# Patient Record
Sex: FEMALE | Race: WHITE
[De-identification: names, ages, dates, MRNs, and addresses within clinical notes are randomized per-mention and may not be internally consistent; named-entity substitution may affect disease eponyms.]

---

## 2020-09-22 ENCOUNTER — HOSPITAL ENCOUNTER (EMERGENCY)
Dept: HOSPITAL 53 - M ED | Age: 60
Discharge: HOME | End: 2020-09-22
Payer: COMMERCIAL

## 2020-09-22 VITALS — SYSTOLIC BLOOD PRESSURE: 133 MMHG | DIASTOLIC BLOOD PRESSURE: 78 MMHG

## 2020-09-22 VITALS — WEIGHT: 268.74 LBS | HEIGHT: 62 IN | BODY MASS INDEX: 49.45 KG/M2

## 2020-09-22 DIAGNOSIS — N64.4: ICD-10-CM

## 2020-09-22 DIAGNOSIS — N28.1: Primary | ICD-10-CM

## 2020-09-22 DIAGNOSIS — Z91.013: ICD-10-CM

## 2020-09-22 LAB
ALBUMIN SERPL BCG-MCNC: 4 GM/DL (ref 3.2–5.2)
ALT SERPL W P-5'-P-CCNC: 25 U/L (ref 12–78)
BASOPHILS # BLD AUTO: 0 10^3/UL (ref 0–0.2)
BASOPHILS NFR BLD AUTO: 0.4 % (ref 0–1)
BILIRUB CONJ SERPL-MCNC: 0.2 MG/DL (ref 0–0.2)
BILIRUB SERPL-MCNC: 0.6 MG/DL (ref 0.2–1)
EOSINOPHIL # BLD AUTO: 0 10^3/UL (ref 0–0.5)
EOSINOPHIL NFR BLD AUTO: 0.5 % (ref 0–3)
HCT VFR BLD AUTO: 42.4 % (ref 36–47)
HGB BLD-MCNC: 14.6 G/DL (ref 12–15.5)
LIPASE SERPL-CCNC: 63 U/L (ref 73–393)
LYMPHOCYTES # BLD AUTO: 1.5 10^3/UL (ref 1.5–5)
LYMPHOCYTES NFR BLD AUTO: 20.5 % (ref 24–44)
MCH RBC QN AUTO: 32.4 PG (ref 27–33)
MCHC RBC AUTO-ENTMCNC: 34.4 G/DL (ref 32–36.5)
MCV RBC AUTO: 94.2 FL (ref 80–96)
MONOCYTES # BLD AUTO: 0.6 10^3/UL (ref 0–0.8)
MONOCYTES NFR BLD AUTO: 7.7 % (ref 0–5)
NEUTROPHILS # BLD AUTO: 5.3 10^3/UL (ref 1.5–8.5)
NEUTROPHILS NFR BLD AUTO: 70.6 % (ref 36–66)
PLATELET # BLD AUTO: 235 10^3/UL (ref 150–450)
PROT SERPL-MCNC: 7.9 GM/DL (ref 6.4–8.2)
RBC # BLD AUTO: 4.5 10^6/UL (ref 4–5.4)
WBC # BLD AUTO: 7.5 10^3/UL (ref 4–10)

## 2020-09-22 PROCEDURE — 36415 COLL VENOUS BLD VENIPUNCTURE: CPT

## 2020-09-22 PROCEDURE — 74177 CT ABD & PELVIS W/CONTRAST: CPT

## 2020-09-22 PROCEDURE — 99284 EMERGENCY DEPT VISIT MOD MDM: CPT

## 2020-09-22 PROCEDURE — 83690 ASSAY OF LIPASE: CPT

## 2020-09-22 PROCEDURE — 74176 CT ABD & PELVIS W/O CONTRAST: CPT

## 2020-09-22 PROCEDURE — 80047 BASIC METABLC PNL IONIZED CA: CPT

## 2020-09-22 PROCEDURE — 81001 URINALYSIS AUTO W/SCOPE: CPT

## 2020-09-22 PROCEDURE — 85025 COMPLETE CBC W/AUTO DIFF WBC: CPT

## 2020-09-22 PROCEDURE — 80076 HEPATIC FUNCTION PANEL: CPT

## 2020-09-22 NOTE — REPVR
PROCEDURE INFORMATION: 

Exam: CT Abdomen And Pelvis With Contrast; Kidneys 

Exam date and time: 9/22/2020 5:24 PM 

Age: 60 years old 

Clinical indication: Abnormal findings; Abnormal radiologic finding of the 

abdomen; Radiologic exam and body structure: CT w/o contrast; Additional info: 

R renal lesion 



TECHNIQUE: 

Imaging protocol: Computed tomography of the abdomen and pelvis with 

intravenous contrast. Exam focused on the kidneys. 

Radiation optimization: All CT scans at this facility use at least one of these 

dose optimization techniques: automated exposure control; mA and/or kV 

adjustment per patient size (includes targeted exams where dose is matched to 

clinical indication); or iterative reconstruction. 

Contrast material: ISOVUE 370; Contrast volume: 100 ml; Contrast route: 

INTRAVENOUS (IV);  



COMPARISON: 

CT ABD PELVIS W/O CONTRAST 9/22/2020 3:34 PM 



FINDINGS: 

Lungs: Clear appearing lung bases. 

Liver: Normal appearing liver. 

Gallbladder and bile ducts:  Post cholecystectomy.

  Pancreas:  Normal in size.

Spleen: Normal spleen. 

Adrenals: Normal adrenal glands. 

Kidneys and ureters: 1.6 cm round low-density lesion upper pole of the right 

kidney consistent with a cyst after the injection of contrast. There is 

enhancement of both kidneys. 

Stomach and bowel: There are multiple diverticula of the left colon and sigmoid 

colon but no evidence of diverticulitis. 



Intraperitoneal space: There is no evidence of free fluid in the abdomen or the 

pelvis. 

Lymph nodes: Unremarkable. No enlarged lymph nodes. 

Vasculature: There is opacification of the aorta which appears intact. 

Bladder: Normal urinary bladder. 

Reproductive: Normal uterus. Normal-sized ovaries. 

Bones/joints:  Severe DJD and severe central canal stenosis L4-L5 with severe 

bilateral neural foraminal narrowing.

  Soft tissues: Unremarkable. 



IMPRESSION: 

1.6 cm cyst upper pole of the right kidney. 



Electronically signed by: Morales Edmond On 09/22/2020  18:12:09 PM

## 2020-09-22 NOTE — REPVR
PROCEDURE INFORMATION: 

Exam: CT Abdomen And Pelvis Without Contrast 

Exam date and time: 9/22/2020 3:45 PM 

Age: 60 years old 

Clinical indication: Abdominal pain; Localized; Left upper quadrant (luq); 

Additional info: Luq pain HX divertic 



TECHNIQUE: 

Imaging protocol: Computed tomography of the abdomen and pelvis without 

contrast. 

Radiation optimization: All CT scans at this facility use at least one of these 

dose optimization techniques: automated exposure control; mA and/or kV 

adjustment per patient size (includes targeted exams where dose is matched to 

clinical indication); or iterative reconstruction. 



COMPARISON: 

No relevant prior studies available. 



FINDINGS: 

Lungs: Clear appearing lung bases. 



Liver: Normal appearing liver. 

Gallbladder and bile ducts: Surgical clips are noted at the gallbladder fossa 

and the patient is status post cholecystectomy. 

Pancreas: The pancreas is normal in size. 

Spleen: Normal. No splenomegaly. 

Adrenals: Normal adrenal glands. 

Kidneys and ureters: There is a 1.7 cm low-density lesion of the upper pole of 

the right kidney. This could represent a cyst. However, I would recommend 

correlation with a CT scan with contrast to determine whether this is cystic or 

solid. If this was a solid lesion it would be of concern for malignancy. There 

is no evidence of stone in the path of the right or left ureter. 

Stomach and bowel: Unremarkable. No obstruction. No mucosal thickening. 

Appendix: No evidence of appendicitis. 



Intraperitoneal space: There is no evidence of pneumoperitoneum. 

Vasculature: Unremarkable. No abdominal aortic aneurysm. 

Lymph nodes: Unremarkable. No enlarged lymph nodes. 

Bladder:  Normal appearing urinary bladder.

Reproductive:  Normal size uterus and ovaries.

Bones/joints: There is grade 2 spondylolisthesis of L4 on L5 which produces 

severe central spinal canal stenosis and severe bilateral L4 neural foraminal 

narrowing. The L3-L4 level demonstrates a large posterior disc protrusion 

causing severe central spinal canal stenosis. The L1-L2 level demonstrates a 

very large posterior disc protrusion causing severe central spinal canal 

stenosis. The T12-L1 level demonstrates a large posterior osteophyte causing 

severe central spinal canal stenosis. T9 through T12 demonstrates posterior 

disc osteophyte complex causing severe central canal stenosis as well. There is 

severe sclerosis and facet hypertrophy lower lumbar region. 

Soft tissues: Unremarkable. 



IMPRESSION: 

1. Severe central spinal canal stenosis at multiple levels as described above. 

2. 1.7 cm low-density lesion right kidney. It would be important to have a CT 

scan with contrast to determine if this is cystic or solid. If this is solid 

than malignant lesion would be a concern. 

3. Numerous diverticula left colon and sigmoid colon with no evidence of 

diverticulitis. 



Electronically signed by: Morales Edmond On 09/22/2020  16:39:48 PM

## 2020-11-20 ENCOUNTER — HOSPITAL ENCOUNTER (OUTPATIENT)
Dept: HOSPITAL 53 - M WUC | Age: 60
End: 2020-11-20
Attending: PHYSICIAN ASSISTANT
Payer: COMMERCIAL

## 2020-11-20 DIAGNOSIS — S73.111A: Primary | ICD-10-CM

## 2020-11-20 DIAGNOSIS — M51.36: ICD-10-CM

## 2020-11-20 DIAGNOSIS — M54.31: ICD-10-CM
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
## 2020-11-20 NOTE — REP
INDICATION:

SCIATICA, SPRAIN.



COMPARISON:

None



TECHNIQUE:

AP and frog-lateral views



FINDINGS:

The hip joint space is symmetric and relatively well maintained.  There is no acute

fracture or destructive osseous lesion



IMPRESSION:

.  Negative exam





<Electronically signed by Luis Walker > 11/20/20 [id number]

## 2020-11-20 NOTE — REP
INDICATION:

SCIATICA, SPRAIN.



COMPARISON:

None



FINDINGS:

There is no evidence of an acute fracture or destructive osseous lesion.  Mild

degenerative changes seen involving the inferior sacroiliac joints.  Degenerative

changes are seen involving the imaged portion of the lumbar spine..



IMPRESSION:

Chronic changes





<Electronically signed by Luis Walker > 11/20/20 [id number]

## 2020-11-20 NOTE — REP
INDICATION:

SCIATICA, SPRAIN



COMPARISON:

None.



TECHNIQUE:

AP, lateral, bilateral oblique, and coned-down views of the lumbar spine.



FINDINGS:

Moderate to advanced multilevel degenerative changes are appreciated including grade 1

anterolisthesis at the L4-5 level of approximately 7.0 mm, endplate sclerosis, disc

space narrowing, and osteophytosis including most pronounced asymmetric right-sided

focal advanced changes at the L3-4 level.  There is no evidence for acute

fracture/compression injury.



IMPRESSION:

Advanced multilevel degenerative changes as described above primarily noted along the

right side of the L3-4 level as well as grade 1 anterolisthesis at the L4-5 level.





<Electronically signed by Hugo Pedro > 11/20/20 [id number]

## 2020-12-23 ENCOUNTER — HOSPITAL ENCOUNTER (EMERGENCY)
Dept: HOSPITAL 53 - M ED | Age: 60
Discharge: HOME | End: 2020-12-23
Payer: COMMERCIAL

## 2020-12-23 ENCOUNTER — HOSPITAL ENCOUNTER (OUTPATIENT)
Dept: HOSPITAL 53 - M WUC | Age: 60
End: 2020-12-23
Attending: PHYSICIAN ASSISTANT
Payer: COMMERCIAL

## 2020-12-23 VITALS — BODY MASS INDEX: 50.59 KG/M2 | WEIGHT: 274.92 LBS | HEIGHT: 62 IN

## 2020-12-23 VITALS — DIASTOLIC BLOOD PRESSURE: 82 MMHG | SYSTOLIC BLOOD PRESSURE: 158 MMHG

## 2020-12-23 DIAGNOSIS — M54.16: Primary | ICD-10-CM

## 2020-12-23 DIAGNOSIS — E03.9: ICD-10-CM

## 2020-12-23 DIAGNOSIS — Z79.899: ICD-10-CM

## 2020-12-23 DIAGNOSIS — Z91.013: ICD-10-CM

## 2020-12-23 DIAGNOSIS — R07.9: Primary | ICD-10-CM

## 2020-12-23 DIAGNOSIS — K57.32: ICD-10-CM

## 2020-12-23 DIAGNOSIS — I10: ICD-10-CM

## 2020-12-23 DIAGNOSIS — Z88.5: ICD-10-CM

## 2020-12-23 PROCEDURE — 93971 EXTREMITY STUDY: CPT

## 2020-12-23 PROCEDURE — 72110 X-RAY EXAM L-2 SPINE 4/>VWS: CPT

## 2020-12-23 PROCEDURE — 96372 THER/PROPH/DIAG INJ SC/IM: CPT

## 2020-12-23 PROCEDURE — 99283 EMERGENCY DEPT VISIT LOW MDM: CPT

## 2020-12-23 NOTE — REP
INDICATION:

CHEST PAIN



COMPARISON:

None.



TECHNIQUE:

Frontal view of the chest with multiple views of the right hemithorax.



FINDINGS:

Frontal view of the chest demonstrates no acute cardiopulmonary process, contusion,

effusion, or pneumothorax.  Multiple views of the right hemithorax demonstrates no

acute rib fracture/injury or pathology.



IMPRESSION:

No acute fracture or rib pathology noted.





<Electronically signed by Hugo Pedro > 12/23/20 1002

## 2020-12-23 NOTE — REPVR
PROCEDURE INFORMATION: 

Exam: US Duplex Left Lower Extremity Veins, Limited 

Exam date and time: 12/23/2020 5:22 PM 

Age: 60 years old 

Clinical indication: Pain; Leg, lower; Left; Additional info: Left leg pain; 

R/O dvt 



TECHNIQUE: 

Imaging protocol: Real-time Duplex ultrasound of the Left Lower Extremity with 

2-D gray scale, color Doppler flow and spectral waveform analysis with image 

documentation. Limited exam focused on the left lower extremity veins. 



COMPARISON: 

No relevant prior studies available. 



FINDINGS: 

Left deep veins: Unremarkable. The common femoral, femoral, proximal profunda 

femoral and popliteal veins are patent without thrombus. Normal Doppler 

waveforms. Normal compressibility and/or augmentation response.  

Left superficial veins: Unremarkable. Saphenofemoral junction is patent without 

thrombus. 



Soft tissues: Unremarkable. 



IMPRESSION: 

No evidence of deep vein thrombosis. 



Electronically signed by: Ulises Hylton On 12/23/2020  17:39:31 PM

## 2020-12-23 NOTE — REP
INDICATION:

left leg pain; ?radicular



COMPARISON:

11/20/2020



TECHNIQUE:

AP, lateral, bilateral oblique, and coned-down views of the lumbar spine.



FINDINGS:

Moderate to advanced multilevel degenerative changes are again noted and stable

including grade 1 anterolisthesis at the L4-5 level of approximately 7 mm as well as

endplate sclerosis, osteophytosis, disc space narrowing and facet arthropathy at

various levels from the lower thoracic to L5-S1 level.  No acute fracture/compression

injury or acute subluxation.



IMPRESSION:

Moderate to advanced multilevel degenerative changes relatively stable compared to

11/20/2020.  No acute fracture/compression injury.





<Electronically signed by Hugo Pedro > 12/23/20 [id number]

## 2021-01-28 ENCOUNTER — HOSPITAL ENCOUNTER (OUTPATIENT)
Dept: HOSPITAL 53 - M RAD | Age: 61
End: 2021-01-28
Attending: ORTHOPAEDIC SURGERY
Payer: COMMERCIAL

## 2021-01-28 DIAGNOSIS — M48.061: ICD-10-CM

## 2021-01-28 DIAGNOSIS — M43.16: ICD-10-CM

## 2021-01-28 DIAGNOSIS — M25.78: ICD-10-CM

## 2021-01-28 DIAGNOSIS — M54.16: Primary | ICD-10-CM

## 2021-01-28 DIAGNOSIS — M51.26: ICD-10-CM

## 2021-01-28 NOTE — REPVR
PROCEDURE INFORMATION: 

Exam: MR Lumbar Spine Without Contrast. 

Exam date and time: 1/28/2021 5:45 PM 

Age: 60 years old 

Clinical indication: Low back pain; Additional info: Radiculopathy lumbar 

region 



TECHNIQUE: 

Imaging protocol: Multiplanar magnetic resonance images of the lumbar spine 

without intravenous contrast. 



COMPARISON: 

CR Spine. Lumbosacral, complete 12/23/2020 3:52 PM 



FINDINGS: 

Vertebrae: Grade 1 anterior spondylolisthesis of L4 on L5. On the STIR sequence 

there is hyperintensity within the right lateral aspect of the L5 vertebral 

body series 401, image 1 frames 8-11. It is poorly visualized on the axial T2 

weighted series series 601, image 1 frames 4 -6. But is seen on the T1 weighted 

sagittal series 201, image 1 frame 9 as a hypointense lesion. The lesion 

appears to measure 1.8 x 2.2 cm. On the T2 sagittal image it is also 

hypointense and therefore likely sclerotic. Series 301, image 1 frame 9. There 

may also be a lesion within the sacrum to the left of midline which is not well 

assessed on this lumbar spine MRI.  There is diffuse degenerative facet 

arthropathy.

Spinal cord: The conus medullaris and lower thoracic spinal cord are 

unremarkable.

L1-L2:  Diffusely bulging annulus and possible subligamentous disc herniation 

posteriorly displacing and mildly compressing the proximal cauda equina just 

below the conus.  There is moderate to severe spinal canal stenosis with the AP 

dimension of the canal measuring 7.5 mm.  There is mild-to-moderate bilateral 

neural foraminal narrowing.

L2-L3:  Diffusely bulging annulus with possible left paracentral disc 

herniation narrowing the left lateral recess and likely affecting the exiting 

left L3 nerve root.  There is moderate bilateral neural foraminal narrowing.

L3-L4:  Diffusely bulging annulus with resultant moderate to severe spinal 

canal stenosis and compression of both exiting L4 nerve roots.  There is 

moderate bilateral neural foraminal narrowing with possible compression of the 

right L3 nerve root.

L4-L5:  Grade 1 anterior spondylolisthesis due to subluxation of the 

degenerated facet joints.  There is severe spinal canal stenosis with 

compression of the thecal sac and both exiting L5 nerve roots.  The neural 

foramina are patent.

L5-S1:  No spinal canal stenosis however there appears to be severe narrowing 

of both neural foramen likely affecting the L5 nerve roots.



Soft tissues:  Unremarkable



IMPRESSION: 

1. Spinal canal stenosis and neural foraminal narrowing at several levels as 

described preps most severe at the L4-L5 level.  There could be multilevel 

radiculopathies.  There also appears to be compression of the proximal cauda 

equine at the L1-L2 level.

2. There is a sclerotic bone lesion to the right of midline within the L5 

vertebra this may be benign but could also be due to metastatic disease-suggest 

clinical correlation.  There may also be a lesion to the left of midline within 

the sacrum which is not well assessed on this lumbar spine MRI.  



Electronically signed by: Carissa Jurado On 01/28/2021  18:54:19 PM

## 2022-11-22 ENCOUNTER — NEW PATIENT (OUTPATIENT)
Dept: URBAN - METROPOLITAN AREA CLINIC 67 | Facility: CLINIC | Age: 62
End: 2022-11-22

## 2022-11-22 DIAGNOSIS — H25.9: ICD-10-CM

## 2022-11-22 DIAGNOSIS — H43.823: ICD-10-CM

## 2022-11-22 DIAGNOSIS — Z79.899: ICD-10-CM

## 2022-11-22 DIAGNOSIS — Z01.00: ICD-10-CM

## 2022-11-22 PROCEDURE — 92004 COMPRE OPH EXAM NEW PT 1/>: CPT | Mod: NC

## 2022-11-22 PROCEDURE — 92134 CPTRZ OPH DX IMG PST SGM RTA: CPT | Mod: NC

## 2022-11-22 PROCEDURE — STUDY STUDY VISIT

## 2022-11-22 ASSESSMENT — TONOMETRY
OS_IOP_MMHG: 14
OD_IOP_MMHG: 15

## 2022-11-22 ASSESSMENT — VISUAL ACUITY
OS_CC: 20/25-2
OD_SC: 20/60-2